# Patient Record
Sex: FEMALE | Race: WHITE | NOT HISPANIC OR LATINO | Employment: STUDENT | RURAL
[De-identification: names, ages, dates, MRNs, and addresses within clinical notes are randomized per-mention and may not be internally consistent; named-entity substitution may affect disease eponyms.]

---
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
## 2025-03-04 ENCOUNTER — OFFICE VISIT (OUTPATIENT)
Dept: FAMILY MEDICINE | Facility: CLINIC | Age: 20
End: 2025-03-04

## 2025-03-04 VITALS
OXYGEN SATURATION: 98 % | WEIGHT: 139.38 LBS | SYSTOLIC BLOOD PRESSURE: 118 MMHG | HEART RATE: 89 BPM | RESPIRATION RATE: 15 BRPM | BODY MASS INDEX: 21.88 KG/M2 | HEIGHT: 67 IN | TEMPERATURE: 99 F | DIASTOLIC BLOOD PRESSURE: 71 MMHG

## 2025-03-04 DIAGNOSIS — M79.672 LEFT FOOT PAIN: ICD-10-CM

## 2025-03-04 DIAGNOSIS — Z32.02 PREGNANCY EXAMINATION OR TEST, NEGATIVE RESULT: ICD-10-CM

## 2025-03-04 DIAGNOSIS — R52 PAIN: ICD-10-CM

## 2025-03-04 DIAGNOSIS — R60.9 SWELLING: Primary | ICD-10-CM

## 2025-03-04 LAB
B-HCG UR QL: NEGATIVE
CTP QC/QA: YES

## 2025-03-04 PROCEDURE — 99499 UNLISTED E&M SERVICE: CPT | Mod: ,,, | Performed by: FAMILY MEDICINE

## 2025-03-04 PROCEDURE — 81025 URINE PREGNANCY TEST: CPT | Mod: QW,,, | Performed by: FAMILY MEDICINE

## 2025-03-04 NOTE — LETTER
March 5, 2025    Luis Fernando Perera  [address] [address] [facility] [place] [address]             [facility]  Family Medicine  [address] [place] [address] [place] [address]  Phone: [phone]  Fax: [phone]   March 5, 2025     Patient: Luis Fernando Perera   YOB: 2005   Date of Visit: 3/4/2025       To Whom it May Concern:    Luis Fernando Perera was seen in my clinic on 3/4/2025. She may return to work on 03/08/2025 .    Please excuse her from any work missed.    If you have any questions or concerns, please don't hesitate to call.    Sincerely,         Enrique Garcia.

## 2025-03-04 NOTE — LETTER
March 5, 2025      [facility]  [address] [place] [address]  Phone: [phone]  Fax: [phone]       Patient: Luis Fernando Perera   YOB: 2005  Date of Visit: 03/05/2025    To Whom It May Concern:    Yunier Perera  was at [facility] on 03/05/2025. The patient may return to work/school on 03/09/2025 with no restrictions. If you have any questions or concerns, or if I can be of further assistance, please do not hesitate to contact me.    Sincerely,    Chaparrita Salazar MA

## 2025-05-20 ENCOUNTER — PATIENT OUTREACH (OUTPATIENT)
Facility: HOSPITAL | Age: 20
End: 2025-05-20

## 2025-05-20 NOTE — PROGRESS NOTES
Population Health Chart Review & Patient Outreach Details    Updates Requested / Reviewed:  [x]  [facility] Reviewed  [x]   Reviewed      Health Maintenance Topics Addressed and Outreach Outcomes / Actions Taken:  Chlamydia Screening  [x] No documentation found in [facility] or [facility] for Chlamydia Screening.  Care everywhere not available.     [x] Comment placed in chart that patient needs this. No upcoming appointment scheduled at this time.

## 2025-05-28 ENCOUNTER — OFFICE VISIT (OUTPATIENT)
Dept: FAMILY MEDICINE | Facility: CLINIC | Age: 20
End: 2025-05-28

## 2025-05-28 VITALS
WEIGHT: 138 LBS | HEIGHT: 67 IN | TEMPERATURE: 98 F | HEART RATE: 90 BPM | RESPIRATION RATE: 15 BRPM | BODY MASS INDEX: 21.66 KG/M2 | DIASTOLIC BLOOD PRESSURE: 67 MMHG | SYSTOLIC BLOOD PRESSURE: 113 MMHG

## 2025-05-28 DIAGNOSIS — Z30.011 ENCOUNTER FOR INITIAL PRESCRIPTION OF CONTRACEPTIVE PILLS: Primary | ICD-10-CM

## 2025-05-28 LAB
B-HCG UR QL: NEGATIVE
CTP QC/QA: YES

## 2025-05-28 PROCEDURE — 81025 URINE PREGNANCY TEST: CPT | Mod: QW,,, | Performed by: NURSE PRACTITIONER

## 2025-05-28 PROCEDURE — 99212 OFFICE O/P EST SF 10 MIN: CPT | Mod: ,,, | Performed by: NURSE PRACTITIONER

## 2025-05-28 RX ORDER — NORGESTIMATE AND ETHINYL ESTRADIOL 7DAYSX3 LO
1 KIT ORAL DAILY
Qty: 30 TABLET | Refills: 11 | Status: SHIPPED | OUTPATIENT
Start: 2025-05-28 | End: 2026-05-28

## 2025-05-28 NOTE — PROGRESS NOTES
[facility]    Chief Complaint      Chief Complaint   Patient presents with    Contraception     Medication refill       History of Present Illness      Luis Fernando Perera is a 19 y.o. female. She  has no past medical history on file., who presents today for medication refill of her BCP.    Past Medical History:  No past medical history on file.    Past Surgical History:   has no past surgical history on file.    Social History:  Social History[1]    I personally reviewed all past medical, surgical, and social.     Review of Systems   Constitutional:  Negative for fatigue.   Eyes:  Negative for visual disturbance.   Respiratory:  Negative for cough and shortness of breath.    Cardiovascular: Negative.    Gastrointestinal:  Negative for abdominal pain, constipation and diarrhea.   Genitourinary:  Negative for menstrual problem.   Musculoskeletal:  Negative for gait problem.   Skin: Negative.    Neurological:  Negative for dizziness and light-headedness.        Medications:  Encounter Medications[2]    Allergies:  Review of patient's allergies indicates:  No Known Allergies    Health Maintenance:  Immunization History   Administered Date(s) Administered    DTaP / Hep B / IPV 2005, 2005, 03/09/2006    DTaP / HiB 10/16/2006    DTaP / IPV 08/06/2010    Hepatitis A, Pediatric/Adolescent, 2 Dose 10/16/2006, 05/22/2009    Hepatitis B, Pediatric/Adolescent 2005    HiB PRP-OMP 2005, 2005    MMR 08/06/2010    MMRV 10/16/2006    Pneumococcal Conjugate - 7 Valent 2005, 2005, 03/09/2006, 10/16/2006    Varicella 08/06/2010      Health Maintenance   Topic Date Due    Hepatitis C Screening  Never done    Lipid Panel  Never done    Chlamydia Screening  Never done    HPV Vaccines (1 - 3-dose series) Never done    COVID-19 Vaccine (1 - 2024-25 season) Never done    HIV Screening  01/20/2029 (Originally 7/25/2020)    Influenza Vaccine (Season Ended) 09/01/2025    TETANUS VACCINE  08/02/2027  "   RSV Vaccine (Age 60+ and Pregnant patients) (1 - 1-dose 75+ series) 07/25/2080    Pneumococcal Vaccines (Age 0-49)  Aged Out        Physical Exam      Vital Signs  Temp: 97.9 °F (36.6 °C)  Temp Source: Oral  Pulse: 90  Resp: 15  BP: 113/67  BP Location: Right arm  Patient Position: Sitting  Pain Score: 0-No pain  Height and Weight  Height: 5' 7" (170.2 cm)  Weight: 62.6 kg (138 lb)  BSA (Calculated - sq m): 1.72 sq meters  BMI (Calculated): 21.6  Weight in (lb) to have BMI = 25: 159.3]    Physical Exam  Vitals and nursing note reviewed.   Constitutional:       Appearance: Normal appearance. She is well-developed.   HENT:      Head: Normocephalic.      Right Ear: Hearing normal.      Left Ear: Hearing normal.      Nose: Nose normal.   Eyes:      General: Lids are normal.      Extraocular Movements: Extraocular movements intact.      Conjunctiva/sclera: Conjunctivae normal.      Pupils: Pupils are equal, round, and reactive to light.   Cardiovascular:      Rate and Rhythm: Normal rate and regular rhythm.      Heart sounds: Normal heart sounds.   Pulmonary:      Effort: Pulmonary effort is normal.      Breath sounds: Normal breath sounds.   Musculoskeletal:         General: Normal range of motion.      Cervical back: Normal range of motion and neck supple.      Right lower leg: No edema.      Left lower leg: No edema.   Skin:     General: Skin is warm and dry.   Neurological:      Mental Status: She is alert and oriented to person, place, and time.      Gait: Gait is intact.   Psychiatric:         Behavior: Behavior is cooperative.          Laboratory:  CBC:      CMP:        Invalid input(s): "CREATININ"  LIPIDS:      TSH:      A1C:        Assessment/Plan     Luis Fernando Perera is a 19 y.o.female with:     1. Encounter for initial prescription of contraceptive pills  -     POCT urine pregnancy         Total time spent face-to-face and non-face-to-face coordinating care for this encounter was: 10 min    Chronic conditions " status updated as per HPI.  Other than changes above, cont current medications and maintain follow up with specialists.  Return to clinic in 1 year(s).    Nanette Meade, FNP  [facility]         [1]   Social History  Tobacco Use    Smoking status: Never     Passive exposure: Never    Smokeless tobacco: Never   Substance Use Topics    Alcohol use: Never    Drug use: Never   [2]   Outpatient Encounter Medications as of 5/28/2025   Medication Sig Dispense Refill    norgestimate-ethinyl estradioL (ORTHO TRI-CYCLEN LO) 0.18/0.215/0.25 mg-25 mcg tablet Take 1 tablet by mouth once daily. 30 tablet 11     No facility-administered encounter medications on file as of 5/28/2025.